# Patient Record
Sex: FEMALE | Race: WHITE
[De-identification: names, ages, dates, MRNs, and addresses within clinical notes are randomized per-mention and may not be internally consistent; named-entity substitution may affect disease eponyms.]

---

## 2017-09-05 ENCOUNTER — HOSPITAL ENCOUNTER (OUTPATIENT)
Dept: HOSPITAL 80 - CIMAGING | Age: 47
End: 2017-09-05
Attending: INTERNAL MEDICINE
Payer: COMMERCIAL

## 2017-09-05 DIAGNOSIS — R50.9: ICD-10-CM

## 2017-09-05 DIAGNOSIS — J98.4: Primary | ICD-10-CM

## 2017-09-05 DIAGNOSIS — R05: ICD-10-CM

## 2017-09-26 ENCOUNTER — HOSPITAL ENCOUNTER (EMERGENCY)
Dept: HOSPITAL 80 - CED | Age: 47
Discharge: HOME | End: 2017-09-26
Payer: COMMERCIAL

## 2017-09-26 VITALS — DIASTOLIC BLOOD PRESSURE: 55 MMHG | HEART RATE: 103 BPM | OXYGEN SATURATION: 93 % | SYSTOLIC BLOOD PRESSURE: 99 MMHG

## 2017-09-26 VITALS — TEMPERATURE: 99.1 F

## 2017-09-26 VITALS — RESPIRATION RATE: 18 BRPM

## 2017-09-26 DIAGNOSIS — E86.9: ICD-10-CM

## 2017-09-26 DIAGNOSIS — E86.0: ICD-10-CM

## 2017-09-26 DIAGNOSIS — K52.9: Primary | ICD-10-CM

## 2017-09-26 LAB
% IMMATURE GRANULYOCYTES: 0.2 % (ref 0–1.1)
ABSOLUTE IMMATURE GRANULOCYTES: 0.03 10^3/UL (ref 0–0.1)
ABSOLUTE NRBC COUNT: 0 10^3/UL (ref 0–0.01)
ADD DIFF?: NO
ADD MORPH?: NO
ADD SCAN?: NO
ALBUMIN SERPL-MCNC: 4 G/DL (ref 3.5–5)
ALP SERPL-CCNC: 65 IU/L (ref 38–126)
ALT SERPL-CCNC: 31 IU/L (ref 9–52)
ANION GAP SERPL CALC-SCNC: 13 MEQ/L (ref 8–16)
AST SERPL-CCNC: 19 IU/L (ref 14–46)
ATYPICAL LYMPHOCYTE FLAG: 0 (ref 0–99)
BILIRUB SERPL-MCNC: 1 MG/DL (ref 0.1–1.4)
CALCIUM SERPL-MCNC: 9.2 MG/DL (ref 8.5–10.4)
CHLORIDE SERPL-SCNC: 104 MEQ/L (ref 97–110)
CO2 SERPL-SCNC: 20 MEQ/L (ref 22–31)
CREAT SERPL-MCNC: 0.6 MG/DL (ref 0.6–1)
ERYTHROCYTE [DISTWIDTH] IN BLOOD BY AUTOMATED COUNT: 12.2 % (ref 11.5–15.2)
FRAGMENT RBC FLAG: 0 (ref 0–99)
GFR SERPL CREATININE-BSD FRML MDRD: > 60 ML/MIN/{1.73_M2}
GLUCOSE SERPL-MCNC: 96 MG/DL (ref 70–100)
HCT VFR BLD CALC: 39.1 % (ref 38–47)
HGB BLD-MCNC: 13.5 G/DL (ref 12.6–16.3)
LEFT SHIFT FLG: 0 (ref 0–99)
LIPEMIA HEMOLYSIS FLAG: 90 (ref 0–99)
MCH RBC BLDCO QN: 31.3 PG (ref 27.9–34.1)
MCHC RBC AUTO-ENTMCNC: 34.5 G/DL (ref 32.4–36.7)
MCV RBC AUTO: 90.7 FL (ref 81.5–99.8)
NRBC-AUTO%: 0 % (ref 0–0.2)
PLATELET # BLD: 243 10^3/UL (ref 150–400)
PLATELET CLUMPS FLAG: 0 (ref 0–99)
PMV BLD AUTO: 9.7 FL (ref 8.7–11.7)
POTASSIUM SERPL-SCNC: 3.9 MEQ/L (ref 3.5–5.2)
PROT SERPL-MCNC: 6.9 G/DL (ref 6.3–8.2)
RBC # BLD AUTO: 4.31 10^6/UL (ref 4.18–5.33)
SODIUM SERPL-SCNC: 137 MEQ/L (ref 134–144)

## 2017-09-26 NOTE — EDPHY
H & P


Stated Complaint: N/V/D 1 WEEK AGO, FEVER TODAY, RECENT ABX TREATMENT


Source: Patient


Exam Limitations: No limitations





- Personal History


LMP (Females 10-55): 22-28 Days Ago





- Medical/Surgical History


Other PMH: DENIES





- Family History


Significant Family History: No pertinent family hx





- Social History


Smoking Status: Never smoked


Alcohol Use: Rarely


Drug Use: None


Time Seen by Provider: 09/26/17 12:47


HPI/ROS: 





This patient presents with fever, vomiting diarrhea and fatigue.  Her recent 

history is notable for a diagnosis of community-acquired pneumonia seen at the 

[facility] at the end of August and treated with doxycycline.  She 

took a 10 day course without resolution of her fever/cough and was then treated 

with Augmentin after consult with her primary physician, Dr. Blum.  She 

completed a week of Augmentin the night before presentation with resolution of 

her fever and improvement in her cough to near resolution.  However, she 

developed fever early this morning to 103 (101 on another thermometer) 

associated with chills, vomiting a few times described as yellow emesis and 

also diarrhea.  She reports some loose stools this week but today's stool has 

more of a mucoid appearance to it.  She reports a few episodes this morning.  

She had no bloody stools, no hematemesis or coffee-ground emesis.  She has an 

associated frontal headache mild intensity 3/10 similar to prior headaches with 

fevers.  She is concerned about potential C diff colitis given her recent 

antibiotics and the mucoid appearing stool.  She felt some lightheadedness at 

home around the time of her vomiting and diarrhea but was able a ambulate and 

arrive by private vehicle with no presyncopal symptoms.





ROS:  Constitutional as per HPI.  Fatigue today.


HEENT:  Mildly hoarse voice since the onset of the respiratory illness that is 

improving.  No sore throat.  No sinus pain.


Neuro:  No confusion.  No focal numbness tingling weakness.


Cardiovascular:  No heart palpitations.  No chest pain.  No lower extremity 

swelling or calf pain.


GI:  Mild lower belly cramping.  No upper belly pain.  Otherwise as per HPI.


:  No dysuria, frequency, urgency, vaginal discharge.  Last menstrual period 

was normal timing 2 weeks ago.


Endocrine:  No complaints


Integumentary:  No skin rash


Musculoskeletal:  Myalgias.


Complete review of symptoms is otherwise negative. (Andrea Canas)





- Social History


Additional Social History: 





No recent travel





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





- Physical Exam


Exam: 


Initial vital signs notable for tachycardia to 113, blood pressure 95/70 which 

patient states is baseline for her.  O2 sat 94% other vitals normal.


General Appearance:  Alert, no distress.


Eyes:  Pupils equal and round no pallor or injection.


ENT, Mouth:  Mucous membranes moist.


Respiratory:  Mild rales at the left base.  Otherwise clear to auscultation 

bilaterally.


Cardiovascular:  Tachycardic with no murmur gallop rub.  No JVD.  No peripheral 

edema.


Gastrointestinal:  Abdomen is soft and nontender, no masses, bowel sounds 

normal.


Neurological:  GCS 15.


Skin:  Warm and dry, no rashes.


Musculoskeletal:  Neck is supple nontender.


Extremities are symmetrical, full range of motion.


Psychiatric:  Mood and affect normal.





DIFFERENTIAL DIAGNOSIS:  After history and physical exam differential diagnosis 

was considered for C diff colitis, gastroenteritis, dehydration, persistent 

pneumonia, influenza or other viral illness (Andrea Canas)


Constitutional: 


 Initial Vital Signs











Temperature (C)  37.4 C   09/26/17 12:54


 


Heart Rate  113 H  09/26/17 12:54


 


Respiratory Rate  18   09/26/17 12:54


 


Blood Pressure  95/70 L  09/26/17 12:54


 


O2 Sat (%)  94   09/26/17 12:54








 











O2 Delivery Mode               Room Air














Allergies/Adverse Reactions: 


 





No Known Allergies Allergy (Unverified 09/26/17 12:52)


 








Home Medications: 














 Medication  Instructions  Recorded


 


Lexapro  09/26/17


 


Promethazine HCl [Phenergan 25 mg HI Q6 PRN #4 suppr 09/26/17





Rectal]  


 


Promethazine HCl [Phenergan 25mg 25 mg PO Q6 PRN #8 tab 09/26/17





(*)]  


 


Vancomycin [Vancomycin (*)] 125 mg PO QID #40 cap 09/26/17


 


Wellbutrin Xl  09/26/17














Medical Decision Making





- Diagnostics


Imaging: I viewed and interpreted images myself





- Diagnostics


Imaging Results: 


Chest x-ray:  Resolution of infiltrate when compared to prior chest x-ray by my 

interpretation. (Andrea Canas)


ED Course/Re-evaluation: 





4:15 p.m., the patient is tolerating oral fluids well.  She wishes to be 

discharged with plan as above by Dr. Mundo Sawant.  She was discharged 

in good condition. (McCollester,Laughlin B)





IV normal saline bolus times 2 L





Orthostatics after 2 L-negative for significant pulse or blood pressure change 

with minimal lightheadedness.





Phenergan 12.5 mg IV with imcomplete resolution of nausea with repeat Phenergan 

dose - resolution of nausea.





Tylenol p. o. for myalgias





3rd L normal saline





I consulted Radiology-Dr. Tu Tidwell and reviewed her chest x-ray to 

confirm my impression of resolution of her infiltrate that was evident on her 

previous CXR.





Discussion:  While patient has mild persistent rales it seems that her 

pneumonia has resolved radiographically and she has minimal respiratory 

symptoms at this time.  Given her new onset of fevers associated with GI 

symptoms-mucoid stools and vomiting, as well as lower belly cramping, suspect C 

diff colitis.





PCR stool and respiratory pathogens is sent to the hospital shortly after 2:00 

p.m. with results pending.





Her CBC reveals mild leukocytosis.  Metabolic profile is normal.  Her venous 

lactate is normal.





Patient is not septic





I offered admission to the patient given her lightheadedness and significant 

fever pending further workup but she declines admission at this time preferring 

further control of nausea and hydration with plan to go home.





I consulted Dr. Adriana Sawant, infectious Disease regarding the patient.  The 

plan is to treat the patient empirically with Vanco 125 four times daily to 

cover C diff with close follow up with Dr. Sawant.  She will be treated with 

Phenergan orally or rectally for nausea.  She will return to the emergency 

department if she has any worsening of her symptoms despite the treatment plan





At 3:27 p.m. the patient is improving with IV hydration and antiemetics with 

plan for home with close follow up with Dr. Sawant within the next 48 hours-

infectious disease.  The patient understands the need to return to the 

emergency department should she have any worsening of her symptoms despite the 

treatment plan.  Treat her empirically for C diff pending the C diff PCR 

results.





I spoke with Dr.Lock Montgomery, [facility] emergency physician at change of 

shift to understands the disposition plan, patient's history and findings here 

today.


 (Andrea Canas)





- Data Points


Laboratory Results: 


 Laboratory Results





 09/26/17 13:30 





 09/26/17 13:30 








Medications Given: 


 








Discontinued Medications





Acetaminophen (Tylenol)  1,000 mg PO EDNOW ONE


   Stop: 09/26/17 14:35


   Last Admin: 09/26/17 14:42 Dose:  1,000 mg


Sodium Chloride (Ns)  1,000 mls @ 0 mls/hr IV ONCE ONE; Wide Open


   PRN Reason: Protocol


   Stop: 09/26/17 12:54


   Last Admin: 09/26/17 13:23 Dose:  1,000 mls


Sodium Chloride (Ns)  1,000 mls @ 0 mls/hr IV EDNOW ONE; Wide Open


   PRN Reason: Protocol


   Stop: 09/26/17 13:24


   Last Admin: 09/26/17 14:15 Dose:  1,000 mls


Sodium Chloride (Ns)  1,000 mls @ 0 mls/hr IV EDNOW ONE; Wide Open


   PRN Reason: Protocol


   Stop: 09/26/17 14:58


   Last Admin: 09/26/17 15:04 Dose:  1,000 mls


Promethazine HCl (Phenergan)  12.5 mg IVP EDNOW ONE


   Stop: 09/26/17 14:21


   Last Admin: 09/26/17 14:25 Dose:  12.5 mg


Promethazine HCl (Phenergan)  12.5 mg IVP EDNOW ONE


   Stop: 09/26/17 14:57


   Last Admin: 09/26/17 15:03 Dose:  12.5 mg








Departure





- Departure


Disposition: Home, Routine, Self-Care


Clinical Impression: 


 Colitis, Dehydration





Condition: Good


Instructions:  Colitis (ED)


Additional Instructions: 


Diagnosis:  1.  Colitis 2.  Dehydration





Plan:  Stop Augmentin





Phenergan for nausea vomiting as needed.





Light diet to feel improved





Probiotics





Vancomycin tabs 1 4 times a day for 10 days





Call Dr. Sawant-infectious disease for follow-up within the next 1-2 days





Return to the emergency department for any significant worsening of her 

symptoms despite the treatment plan


Referrals: 


Willow White MD [Primary Care Provider] - As per Instructions


Prescriptions: 


Promethazine HCl [Phenergan  Rectal] 25 mg HI Q6 PRN #4 suppr


 PRN Reason: Nausea/Vomiting With Chemo


Promethazine HCl [Phenergan 25mg (*)] 25 mg PO Q6 PRN #8 tab


 PRN Reason: nausea


Vancomycin [Vancomycin (*)] 125 mg PO QID #40 cap

## 2017-11-16 ENCOUNTER — HOSPITAL ENCOUNTER (OUTPATIENT)
Dept: HOSPITAL 80 - FIMAGING | Age: 47
End: 2017-11-16
Attending: INTERNAL MEDICINE
Payer: COMMERCIAL

## 2017-11-16 DIAGNOSIS — Z12.31: Primary | ICD-10-CM

## 2017-11-16 PROCEDURE — G0202 SCR MAMMO BI INCL CAD: HCPCS

## 2018-12-10 ENCOUNTER — HOSPITAL ENCOUNTER (OUTPATIENT)
Dept: HOSPITAL 80 - FIMAGING | Age: 48
End: 2018-12-10
Attending: INTERNAL MEDICINE
Payer: COMMERCIAL

## 2018-12-10 DIAGNOSIS — Z12.31: Primary | ICD-10-CM
